# Patient Record
Sex: MALE | HISPANIC OR LATINO | Employment: FULL TIME | URBAN - METROPOLITAN AREA
[De-identification: names, ages, dates, MRNs, and addresses within clinical notes are randomized per-mention and may not be internally consistent; named-entity substitution may affect disease eponyms.]

---
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
## 2024-01-11 ENCOUNTER — APPOINTMENT (OUTPATIENT)
Dept: MEDICAL GROUP | Facility: CLINIC | Age: 19
End: 2024-01-11
Payer: COMMERCIAL

## 2024-01-19 ENCOUNTER — OFFICE VISIT (OUTPATIENT)
Dept: MEDICAL GROUP | Facility: CLINIC | Age: 19
End: 2024-01-19
Payer: COMMERCIAL

## 2024-01-19 VITALS
HEIGHT: 69 IN | WEIGHT: 200 LBS | OXYGEN SATURATION: 95 % | BODY MASS INDEX: 29.62 KG/M2 | TEMPERATURE: 98 F | SYSTOLIC BLOOD PRESSURE: 116 MMHG | DIASTOLIC BLOOD PRESSURE: 66 MMHG | HEART RATE: 78 BPM

## 2024-01-19 DIAGNOSIS — L60.0 INGROWN TOENAIL OF RIGHT FOOT: ICD-10-CM

## 2024-01-19 PROCEDURE — 3074F SYST BP LT 130 MM HG: CPT | Mod: GC

## 2024-01-19 PROCEDURE — 11765 WEDGE EXCISION SKN NAIL FOLD: CPT | Mod: GC,TA

## 2024-01-19 PROCEDURE — 3078F DIAST BP <80 MM HG: CPT | Mod: GC

## 2024-01-19 ASSESSMENT — PATIENT HEALTH QUESTIONNAIRE - PHQ9: CLINICAL INTERPRETATION OF PHQ2 SCORE: 0

## 2024-01-22 NOTE — PROGRESS NOTES
Procedure toenail removal:      Physical:  Left great toe with ingrown on both medial and lateral aspects. Significant inflammation present on medial aspect.    Informed consent is obtained. Using a 50-50 mixture of 1% plain lidocaine, a ring block was done (6 cc total). Chlorhexadine was used to clean his toe. Using a tourniquet for hemostasis and sterile instruments, I freed the nail from the nail bed and removed a wedge of the nail including the ingrown portion to the level of the nail skin fold. This was done bilaterally. This was well tolerated, minimal bleeding. Antibiotic ointment and a dressing are applied. Remove the dressing tomorrow and begin frequent soaks and have a follow up visit in a week. Call if pain, erythema fever or bleeding. Wound care and dressing instructions are given.      Harley Salgado MD  Supervised by Kali Miranda MD

## 2024-02-12 ENCOUNTER — PATIENT MESSAGE (OUTPATIENT)
Dept: MEDICAL GROUP | Facility: CLINIC | Age: 19
End: 2024-02-12